# Patient Record
Sex: FEMALE | Race: WHITE | NOT HISPANIC OR LATINO
[De-identification: names, ages, dates, MRNs, and addresses within clinical notes are randomized per-mention and may not be internally consistent; named-entity substitution may affect disease eponyms.]

---
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
## 2024-04-29 NOTE — PHYSICAL EXAM
[Acanthosis Nigricans___] : no acanthosis nigricans [de-identified] : mild-moderate acne on cheeks 33